# Patient Record
Sex: FEMALE | Race: WHITE
[De-identification: names, ages, dates, MRNs, and addresses within clinical notes are randomized per-mention and may not be internally consistent; named-entity substitution may affect disease eponyms.]

---

## 2021-02-25 ENCOUNTER — TRANSCRIBE ORDERS (OUTPATIENT)
Dept: OTHER | Age: 51
End: 2021-02-25

## 2021-02-25 DIAGNOSIS — N85.02 COMPLEX ENDOMETRIAL HYPERPLASIA WITH ATYPIA: Primary | ICD-10-CM

## 2021-02-26 ENCOUNTER — TELEPHONE (OUTPATIENT)
Dept: ONCOLOGY | Facility: CLINIC | Age: 51
End: 2021-02-26

## 2021-02-26 NOTE — TELEPHONE ENCOUNTER
The patient called and said she will be going to a different facility. We will not have to schedule an appointment.